# Patient Record
Sex: FEMALE | URBAN - METROPOLITAN AREA
[De-identification: names, ages, dates, MRNs, and addresses within clinical notes are randomized per-mention and may not be internally consistent; named-entity substitution may affect disease eponyms.]

---

## 2017-06-29 ENCOUNTER — OFFICE VISIT (OUTPATIENT)
Dept: NEUROLOGY | Facility: MEDICAL CENTER | Age: 57
End: 2017-06-29
Payer: COMMERCIAL

## 2017-06-29 VITALS
WEIGHT: 218.2 LBS | HEART RATE: 94 BPM | SYSTOLIC BLOOD PRESSURE: 128 MMHG | TEMPERATURE: 97.5 F | BODY MASS INDEX: 34.25 KG/M2 | RESPIRATION RATE: 16 BRPM | HEIGHT: 67 IN | DIASTOLIC BLOOD PRESSURE: 82 MMHG | OXYGEN SATURATION: 98 %

## 2017-06-29 DIAGNOSIS — G40.309 GENERALIZED CONVULSIVE EPILEPSY (HCC): ICD-10-CM

## 2017-06-29 PROCEDURE — 99213 OFFICE O/P EST LOW 20 MIN: CPT | Performed by: PSYCHIATRY & NEUROLOGY

## 2017-06-29 RX ORDER — VALPROIC ACID 250 MG/1
250 CAPSULE, LIQUID FILLED ORAL
Qty: 450 CAP | Refills: 3 | Status: SHIPPED | OUTPATIENT
Start: 2017-06-29 | End: 2018-05-03 | Stop reason: SDUPTHER

## 2017-06-29 RX ORDER — VALPROIC ACID 250 MG/1
250 CAPSULE, LIQUID FILLED ORAL
Qty: 450 CAP | Refills: 3 | Status: SHIPPED | OUTPATIENT
Start: 2017-06-29 | End: 2017-06-29 | Stop reason: SDUPTHER

## 2017-06-29 RX ORDER — ATORVASTATIN CALCIUM 10 MG/1
TABLET, FILM COATED ORAL
Refills: 3 | COMMUNITY
Start: 2017-06-22

## 2017-06-29 NOTE — MR AVS SNAPSHOT
"        Isabel Burleson   2017 11:40 AM   Office Visit   MRN: [id number]    Department:  [facility]   Dept Phone:  [phone]    Description:  Female : 1960   Provider:  Fabian Fagan M.D.           Reason for Visit     Follow-Up Generalized convulsive epilepsy      Allergies as of 2017     Allergen Noted Reactions    Asa [Aspirin] 2013   Hives      You were diagnosed with     Generalized convulsive epilepsy (CMS-HCC)   [1116677]         Vital Signs     Blood Pressure Pulse Temperature Respirations Height Weight    128/82 mmHg 94 36.4 °C (97.5 °F) 16 1.702 m (5' 7.01\") 98.975 kg (218 lb 3.2 oz)    Body Mass Index Oxygen Saturation Smoking Status             34.17 kg/m2 98% Former Smoker         Basic Information     Date Of Birth Sex Race Ethnicity Preferred Language    1960 Female Unknown Unknown English      Your appointments     2018 11:20 AM   Follow Up Visit with Fabian Fagan M.D.   [facility] (--)    [address] [place] [address], [address]  [facility] [id number] [phone]           You will be receiving a confirmation call a few days before your appointment from our automated call confirmation system.              Problem List              ICD-10-CM Priority Class Noted - Resolved    Type II or unspecified type diabetes mellitus with neurological manifestations, not stated as uncontrolled E11.49   Unknown - Present    Generalized convulsive epilepsy (CMS-HCC) G40.309   Unknown - Present    Obesity (BMI 30-39.9) E66.9   Unknown - Present    ASTHMA    Unknown - Present    GOUT    Unknown - Present    Elevated blood pressure [id number]   2015 - Present      Health Maintenance        Date Due Completion Dates    IMM HEP B VACCINE (1 of 3 - Primary Series) 1960 ---    IMM DTaP/Tdap/Td Vaccine (1 - Tdap) 3/25/1977 ---    IMM PNEUMOCOCCAL 19-64 (ADULT) MEDIUM RISK SERIES (1 of 1 - PPSV23) 3/25/1977 ---    PAP SMEAR 3/25/1979 ---   " COLONOSCOPY 3/25/2008 ---    MAMMOGRAM 8/9/2013 8/9/2012, 4/7/2010, 4/7/2010, 2/5/2009, 2/5/2009, 10/15/2007, 10/15/2007, 8/14/2006, 7/15/2005, 6/24/2004    RETINAL SCREENING 3/1/2015 3/1/2014    A1C SCREENING 3/2/2016 9/2/2015, 4/15/2015, 7/2/2014, 1/3/2014    FASTING LIPID PROFILE 4/21/2016 4/21/2015, 2/12/2013    URINE ACR / MICROALBUMIN 4/21/2016 4/21/2015, 2/12/2013    SERUM CREATININE 4/21/2016 4/21/2015, 2/12/2013    DIABETES MONOFILAMENT / LE EXAM 9/2/2016 9/2/2015 (Done), 4/15/2015 (N/S), 1/3/2014    Override on 9/2/2015: Done    Override on 4/15/2015: (N/S)            Current Immunizations     No immunizations on file.      Below and/or attached are the medications your provider expects you to take. Review all of your home medications and newly ordered medications with your provider and/or pharmacist. Follow medication instructions as directed by your provider and/or pharmacist. Please keep your medication list with you and share with your provider. Update the information when medications are discontinued, doses are changed, or new medications (including over-the-counter products) are added; and carry medication information at all times in the event of emergency situations     Allergies:  ASA - Hives               Medications  Valid as of: June 29, 2017 - 11:58 AM    Generic Name Brand Name Tablet Size Instructions for use    Atorvastatin Calcium (Tab) LIPITOR 10 MG TAKE 1 TABLET BY MOUTH ONCE DAILY FOR HIGH CHOLESTEROL        Blood Glucose Monitoring Suppl (Misc) Blood Glucose Monitoring Suppl SUPPLIES Test strips for Accucheck Anny meter. Testing blood sugar 2 times per day. ICD 9 250.00 Type 2 diabetes        Blood Glucose Monitoring Suppl (Misc) Blood Glucose Monitoring Suppl SUPPLIES One Touch Ultra test strips. Test once daily. Type 2 diabetes 250.00        Calcium Carbonate-Vitamin D (Tab) Calcium-D 600-400 MG-UNIT Take  by mouth.          Dapagliflozin Propanediol (Tab) Dapagliflozin Propanediol 5  MG Take  by mouth.        Guaifenesin-Codeine (Syrup) CHERATUSSIN -10 MG/5ML TAKE 1 TEASPOONFUL BY MOUTH EVERY 4 TO 6 HOURS AS NEEDED FOR COUGH        Lancets (Misc) ACCU-CHEK FASTCLIX LANCETS  1 Each by Does not apply route every day.        Lancets (Misc) Lancets  Accucheck FastClix lancets.  Using 2 per day. ICD9 250.00 Type 2 diabetes        Loratadine (Tab) CLARITIN 10 MG Take 10 mg by mouth as needed.        MetFORMIN HCl (Tab) GLUCOPHAGE 1000 MG Take 1 Tab by mouth 2 times a day, with meals.        SITagliptin Phosphate (Tab) JANUVIA 100 MG Take 100 mg by mouth every day.        Valproic Acid (Cap) DEPAKENE 250 MG Take 1 Cap by mouth 5 Times a Day.        .                 Medicines prescribed today were sent to:     None      Medication refill instructions:       If your prescription bottle indicates you have medication refills left, it is not necessary to call your provider’s office. Please contact your pharmacy and they will refill your medication.    If your prescription bottle indicates you do not have any refills left, you may request refills at any time through one of the following ways: The online [facility] system (except Urgent Care), by calling your provider’s office, or by asking your pharmacy to contact your provider’s office with a refill request. Medication refills are processed only during regular business hours and may not be available until the next business day. Your provider may request additional information or to have a follow-up visit with you prior to refilling your medication.   *Please Note: Medication refills are assigned a new Rx number when refilled electronically. Your pharmacy may indicate that no refills were authorized even though a new prescription for the same medication is available at the pharmacy. Please request the medicine by name with the pharmacy before contacting your provider for a refill.           [facility] Access Code: [email or web address]  Expires: 7/29/2017 11:58  AM    Your email address is not on file at [facility].  Email Addresses are required for you to sign up for [facility], please contact [phone] to verify your personal information and to provide your email address prior to attempting to register for Cotopaxit.    Isabel PATEL [address] [place] [id number]    Cotopaxit  A secure, online tool to manage your health information     [facility]’s Trevena® is a secure, online tool that connects you to your personalized health information from the privacy of your home -- day or night - making it very easy for you to manage your healthcare. Once the activation process is completed, you can even access your medical information using the Trevena joao, which is available for free in the Apple Joao store or Google Play store.     To learn more about Trevena, visit [email or web address]    There are two levels of access available (as shown below):   My Chart Features  [facility] Primary Care Doctor [facility]  Specialists [facility]  Urgent  Care Non-[facility] Primary Care Doctor   Email your healthcare team securely and privately 24/7 X X X    Manage appointments: schedule your next appointment; view details of past/upcoming appointments X      Request prescription refills. X      View recent personal medical records, including lab and immunizations X X X X   View health record, including health history, allergies, medications X X X X   Read reports about your outpatient visits, procedures, consult and ER notes X X X X   See your discharge summary, which is a recap of your hospital and/or ER visit that includes your diagnosis, lab results, and care plan X X  X     How to register for Cotopaxit:  Once your e-mail address has been verified, follow the following steps to sign up for Cotopaxit.     1. Go to  [email or web address]  2. Click on the Sign Up Now box, which takes you to the New Member Sign Up page. You will need to provide the following information:  a. Enter your  [facility] Access Code exactly as it appears at the top of this page. (You will not need to use this code after you’ve completed the sign-up process. If you do not sign up before the expiration date, you must request a new code.)   b. Enter your date of birth.   c. Enter your home email address.   d. Click Submit, and follow the next screen’s instructions.  3. Create a [facility]ic ID. This will be your [facility] login ID and cannot be changed, so think of one that is secure and easy to remember.  4. Create a Managed Systemst password. You can change your password at any time.  5. Enter your Password Reset Question and Answer. This can be used at a later time if you forget your password.   6. Enter your e-mail address. This allows you to receive e-mail notifications when new information is available in [facility].  7. Click Sign Up. You can now view your health information.    For assistance activating your [employer] account, call [phone]

## 2017-06-29 NOTE — PROGRESS NOTES
"Subjective:      Isabel Burleson is a 57 y.o. female who presents for annual follow-up with a history of generalized seizures.    DARRYN Hall continues to do well. She has had no seizure recurrences over the last year. Remains on name brand formulation Depakene 250 mg tablets taken 5 times a day. On occasion she will forget a morning dose, is uncomfortable driving, so she may miss a morning shift of 4 hours at work as a result. She will double up on her next dose to make up the difference. She tolerates this when it occurs. Fortunately she has had no seizures. She tolerates the drug otherwise.    Medical, surgical and family histories are reviewed, there are no new drug allergies. She is on her Depakene 250 mg tablets as above, also Lipitor, Januvia, Glucophage, the rest is per the electronic health record. She is now working several different jobs within the [employer].    Review of Systems   All other systems reviewed and are negative.       Objective:     /82 mmHg  Pulse 94  Temp(Src) 36.4 °C (97.5 °F)  Resp 16  Ht 1.702 m (5' 7.01\")  Wt 98.975 kg (218 lb 3.2 oz)  BMI 34.17 kg/m2  SpO2 98%     Physical Exam    She appears in no acute distress. Vital signs are stable. There was no malar rash. The neck is supple, cardiac evaluation is unremarkable. Carotid pulses are present without asymmetry. Including mental status, cranial nerves, motor, coordination, and sensory evaluations, the examination is essentially unchanged and unremarkable for significant deficits nor signs of toxicities.     Assessment/Plan:     1. Generalized convulsive epilepsy (CMS-HCC)  We will continue the Depakene 250 mg, 5 times a day, regimen. She always requires name brand formulation of the drug for efficacy and tolerability. FMLA forms were completed for her, allowing her a 4 hour interval once a month to accommodate the occasional missed doses of her drug. There is a risk of seizure recurrence, thus driving " restrictions need to be instituted temporary, though she can drive and return to work after 4 hours. I will follow-up with her in one year.    - valproic acid (DEPAKENE) 250 MG Cap; Take 1 Cap by mouth 5 Times a Day.  Dispense: 450 Cap; Refill: 3    Time: Evaluation of 20 minutes for exam come review, discussion, and education  Discussion: As mentioned in assessment, over 70% of the time spent face-to-face counseling and coordinating care

## 2018-05-03 DIAGNOSIS — G40.309 GENERALIZED CONVULSIVE EPILEPSY (HCC): ICD-10-CM

## 2018-05-07 RX ORDER — VALPROIC ACID 250 MG/1
250 CAPSULE, LIQUID FILLED ORAL
Qty: 450 CAP | Refills: 3 | Status: SHIPPED | OUTPATIENT
Start: 2018-05-07 | End: 2018-09-12 | Stop reason: CLARIF

## 2018-09-12 ENCOUNTER — OFFICE VISIT (OUTPATIENT)
Dept: NEUROLOGY | Facility: MEDICAL CENTER | Age: 58
End: 2018-09-12
Payer: COMMERCIAL

## 2018-09-12 VITALS
SYSTOLIC BLOOD PRESSURE: 120 MMHG | TEMPERATURE: 97.6 F | OXYGEN SATURATION: 95 % | HEIGHT: 67 IN | WEIGHT: 224.6 LBS | DIASTOLIC BLOOD PRESSURE: 84 MMHG | HEART RATE: 102 BPM | RESPIRATION RATE: 17 BRPM | BODY MASS INDEX: 35.25 KG/M2

## 2018-09-12 DIAGNOSIS — G40.309 GENERALIZED CONVULSIVE EPILEPSY (HCC): Primary | ICD-10-CM

## 2018-09-12 PROCEDURE — 99213 OFFICE O/P EST LOW 20 MIN: CPT | Performed by: PSYCHIATRY & NEUROLOGY

## 2018-09-12 ASSESSMENT — ENCOUNTER SYMPTOMS
FALLS: 0
SEIZURES: 0
TREMORS: 0
LOSS OF CONSCIOUSNESS: 0
WEIGHT LOSS: 0
HEMOPTYSIS: 0
BLOOD IN STOOL: 0

## 2018-09-12 ASSESSMENT — PATIENT HEALTH QUESTIONNAIRE - PHQ9: CLINICAL INTERPRETATION OF PHQ2 SCORE: 0

## 2018-09-12 NOTE — PROGRESS NOTES
"Subjective:      Isabel Burleson is a 58 y.o. female who presents for follow-up with a history of generalized seizures.    HPI    Over the last year she has had no seizure recurrences. She can on occasion miss the evening dose of 2 tablets, she will take them the next morning, wait a few hours and takes her morning dose of one tablet at that point, and then one tablet throughout the rest of the day twice, so she is never actually missed a dose. She tolerates the medication without issue otherwise. Her last seizure occurrence has been quite some time ago.    Her diabetes is doing well, there have been no issues with hypoglycemia and symptomatically seizures. Medical, surgical and family histories are reviewed in the electronic health record, there are no new drug allergies. 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 She is on Depakene 250 mg, name brand formulation only, 1 tablet 3 times a day and 2 tablets at bedtime, Januvia, Glucophage, Lipitor and vitamin D with calcium supplements.    Review of Systems   Constitutional: Negative for malaise/fatigue and weight loss.   HENT: Negative for nosebleeds.    Respiratory: Negative for hemoptysis.    Cardiovascular: Negative for leg swelling.   Gastrointestinal: Negative for blood in stool.   Genitourinary: Negative for hematuria.   Musculoskeletal: Negative for falls.   Skin: Negative for itching and rash.   Neurological: Negative for tremors, seizures and loss of consciousness.   All other systems reviewed and are negative.       Objective:     /84   Pulse (!) 102   Temp 36.4 °C (97.6 °F)   Resp 17   Ht 1.702 m (5' 7\")   Wt 101.9 kg (224 lb 9.6 oz)   SpO2 95%   BMI 35.18 kg/m²      Physical Exam    She appears in no acute distress. Vital signs are stable. There is no malar rash or temporal tenderness. The neck is supple, range of motion is full. Cardiac " evaluation is unremarkable. There is no evidence of rash, bruising, or edema.    Mental status is intact. PERRLA/EOMI, there are no nystagmus, visual fields are full, facial movements are symmetric, sensory exam was intact to temperature on both sides of the face, there is no bulbar dysfunction, the tongue and uvula are midline, jaw jerk is absent, shoulder shrug is symmetric. Musculoskeletal exam reveals normal tone without tremor, asterixis, or drift. Strength is 5/5 bilaterally in all 4 extremities, reflexes are symmetric, both toes are downgoing. She walks with normal station, there is no appendicular dystaxia, and gait is normal and stride length. There is no appendicular dystaxia. Fine motor control is intact in all 4 extremity. Sensory exam is intact to vibration.     Assessment/Plan:     1. Generalized convulsive epilepsy (HCC)  Clinically stable, we will continue the Depakene 250 mg tablets taken over the course of the day. She is compliant. There is no reason to check drug levels, there are no signs of toxicities on exam, she is having no seizure breakthroughs. We can follow-up in one year otherwise, she knows circumstances under which she needs to go to an emergency room versus call my office for seizure recurrence. She is safe to drive.    - DEPAKENE 250 MG Cap; 1 tab 3 times a day, and 2 tab qhs  Dispense: 450 Cap; Refill: 3    Time: 20 minutes is spent face-to-face with the patient for exam, review, discussion, and education, this over 50% of the time spent counseling and coordinating care.

## 2019-08-30 ENCOUNTER — TELEPHONE (OUTPATIENT)
Dept: NEUROLOGY | Facility: MEDICAL CENTER | Age: 59
End: 2019-08-30

## 2019-08-30 DIAGNOSIS — G40.309 GENERALIZED SEIZURE DISORDER (HCC): ICD-10-CM

## 2019-08-30 RX ORDER — DIVALPROEX SODIUM 250 MG
TABLET, DELAYED RELEASE (ENTERIC COATED) ORAL
Qty: 150 TAB | Refills: 11 | Status: SHIPPED | OUTPATIENT
Start: 2019-08-30 | End: 2020-08-03 | Stop reason: SDUPTHER

## 2019-08-30 RX ORDER — DIVALPROEX SODIUM 250 MG/1
TABLET, DELAYED RELEASE ORAL
Qty: 150 TAB | Refills: 11 | Status: SHIPPED | OUTPATIENT
Start: 2019-08-30 | End: 2019-08-30 | Stop reason: SDUPTHER

## 2019-08-30 NOTE — TELEPHONE ENCOUNTER
Spoke with pharmacy in regards to the patient's Depakene.  is discontinuing Depakene but they will still be able to get Depakote. Did you want to change the patient's prescription? She is wary about going to the generic. Please advise.     [facility]

## 2019-08-31 NOTE — TELEPHONE ENCOUNTER
If the pharmaceutical company that makes Depakene formulation is no longer doing so, there is little that we can do.  [place]  In any case, there are other forms of the drug that can be taken safely, the likelihood that she will lose control of her seizures is incredibly small.  The greater likelihood, though still small, is that she may have some side effects with the change in formulation from Depakene to valproic acid/Depakote.  There is still is a 250 mg strength tablet of the latter medication, so her overall medication regimen and timing does not need to be changed.  I will forward a prescription for the Depakote 250 mg tablets, 1 tablet 3 times daily and 2 tablets nightly with refills to the pharmacy.

## 2019-09-12 ENCOUNTER — OFFICE VISIT (OUTPATIENT)
Dept: NEUROLOGY | Facility: MEDICAL CENTER | Age: 59
End: 2019-09-12
Payer: COMMERCIAL

## 2019-09-12 VITALS
WEIGHT: 232 LBS | BODY MASS INDEX: 36.41 KG/M2 | DIASTOLIC BLOOD PRESSURE: 78 MMHG | RESPIRATION RATE: 14 BRPM | HEART RATE: 108 BPM | HEIGHT: 67 IN | OXYGEN SATURATION: 96 % | SYSTOLIC BLOOD PRESSURE: 130 MMHG | TEMPERATURE: 96 F

## 2019-09-12 DIAGNOSIS — G40.309 GENERALIZED SEIZURE DISORDER (HCC): Primary | ICD-10-CM

## 2019-09-12 PROCEDURE — 99213 OFFICE O/P EST LOW 20 MIN: CPT | Performed by: PSYCHIATRY & NEUROLOGY

## 2019-09-12 ASSESSMENT — ENCOUNTER SYMPTOMS
BRUISES/BLEEDS EASILY: 0
SEIZURES: 0
BLOOD IN STOOL: 0
TREMORS: 0
LOSS OF CONSCIOUSNESS: 0

## 2019-09-12 ASSESSMENT — PATIENT HEALTH QUESTIONNAIRE - PHQ9: CLINICAL INTERPRETATION OF PHQ2 SCORE: 0

## 2020-08-03 ENCOUNTER — OFFICE VISIT (OUTPATIENT)
Dept: NEUROLOGY | Facility: MEDICAL CENTER | Age: 60
End: 2020-08-03
Payer: COMMERCIAL

## 2020-08-03 VITALS
BODY MASS INDEX: 34.75 KG/M2 | SYSTOLIC BLOOD PRESSURE: 128 MMHG | HEART RATE: 110 BPM | DIASTOLIC BLOOD PRESSURE: 74 MMHG | OXYGEN SATURATION: 97 % | WEIGHT: 229.28 LBS | RESPIRATION RATE: 20 BRPM | HEIGHT: 68 IN

## 2020-08-03 DIAGNOSIS — G40.309 GENERALIZED SEIZURE DISORDER (HCC): ICD-10-CM

## 2020-08-03 PROCEDURE — 99213 OFFICE O/P EST LOW 20 MIN: CPT | Performed by: PSYCHIATRY & NEUROLOGY

## 2020-08-03 RX ORDER — DIVALPROEX SODIUM 250 MG
TABLET, DELAYED RELEASE (ENTERIC COATED) ORAL
Qty: 150 TAB | Refills: 11 | Status: SHIPPED | OUTPATIENT
Start: 2020-08-03 | End: 2021-08-02

## 2020-08-03 ASSESSMENT — ENCOUNTER SYMPTOMS
TREMORS: 0
MEMORY LOSS: 0
DIARRHEA: 0
SEIZURES: 0
LOSS OF CONSCIOUSNESS: 0

## 2020-08-03 ASSESSMENT — PATIENT HEALTH QUESTIONNAIRE - PHQ9: CLINICAL INTERPRETATION OF PHQ2 SCORE: 0

## 2020-08-03 NOTE — PROGRESS NOTES
"Subjective:      Isabel Burleson is a 60 y.o. female who presents for annual follow-up, with a history of generalized seizures.    DARRYN Hall states that she has had no seizures over the last year.  She is compliant with her Depakote regimen, she has never even late with her drugs.  She has tolerated the drug without issues of tremor, sleepiness, GI disturbances, rash, or incoordination.    Medical, surgical and family histories are reviewed in the electronic health record, there are no new drug allergies.  She is on Depakote 250 mg 3 times a day with each meal and 500 mg nightly, also Januvia, Glucophage, Lipitor, vitamin D with calcium and Claritin.    Review of Systems   Constitutional: Negative for malaise/fatigue.   Gastrointestinal: Negative for diarrhea.   Neurological: Negative for tremors, seizures and loss of consciousness.   Psychiatric/Behavioral: Negative for memory loss.   All other systems reviewed and are negative.       Objective:     /74 (BP Location: Left arm, Patient Position: Sitting, BP Cuff Size: Adult)   Pulse (!) 110   Resp 20   Ht 1.715 m (5' 7.5\")   Wt 104 kg (229 lb 4.5 oz)   SpO2 97%   BMI 35.38 kg/m²      Physical Exam    She appears in no acute distress.  Her vital signs are stable.  She is clean, appropriately dressed, and cooperative, and very pleasant spirit and demeanor.  There is no malar rash or jaw claudication.  The neck is supple, range of motion is full.  Cardiac evaluation is unremarkable.    Including mental status, cranial nerves, musculoskeletal, reflex, coordination, and since evaluations, the full neurologic examination is done and reveals no evidence of deficits nor toxicities.  Of note, the ankle jerks are diminished but still present bilaterally.  Vibration is diminished below the the ankles.     Assessment/Plan:     1. Generalized seizure disorder (HCC)  She is doing well, we will simply continue her present regimen.  She is safe to drive, " compliance is not an issue.  She understands circumstances that would bring her to the emergency room versus calling the office for seizure recurrence.  She has a medical identification that she wears 24/7.    - DEPAKOTE 250 MG Tablet Delayed Response; 1 tab tid and 2 tab qhs  Dispense: 150 Tab; Refill: 11    Time: 20-minute spent face-to-face for exam, review, discussion, and education, of this over 50% of the time spent counseling and coordinating care.

## 2021-09-22 DIAGNOSIS — G40.309 GENERALIZED SEIZURE DISORDER (HCC): ICD-10-CM

## 2021-09-23 RX ORDER — DIVALPROEX SODIUM 250 MG
TABLET, DELAYED RELEASE (ENTERIC COATED) ORAL
Qty: 150 TABLET | Refills: 1 | Status: SHIPPED | OUTPATIENT
Start: 2021-09-23 | End: 2021-10-25 | Stop reason: SDUPTHER

## 2021-09-23 NOTE — TELEPHONE ENCOUNTER
Received request via: Pharmacy    Was the patient seen in the last year in this department? No     Does the patient have an active prescription (recently filled or refills available) for medication(s) requested? No     Next appt 10/25/2021

## 2021-10-25 ENCOUNTER — OFFICE VISIT (OUTPATIENT)
Dept: NEUROLOGY | Facility: MEDICAL CENTER | Age: 61
End: 2021-10-25
Attending: PSYCHIATRY & NEUROLOGY
Payer: COMMERCIAL

## 2021-10-25 VITALS
HEIGHT: 68 IN | OXYGEN SATURATION: 93 % | RESPIRATION RATE: 14 BRPM | DIASTOLIC BLOOD PRESSURE: 70 MMHG | WEIGHT: 240.3 LBS | BODY MASS INDEX: 36.42 KG/M2 | SYSTOLIC BLOOD PRESSURE: 118 MMHG | TEMPERATURE: 98 F | HEART RATE: 82 BPM

## 2021-10-25 DIAGNOSIS — G40.309 GENERALIZED SEIZURE DISORDER (HCC): Primary | ICD-10-CM

## 2021-10-25 PROCEDURE — 99212 OFFICE O/P EST SF 10 MIN: CPT | Performed by: PSYCHIATRY & NEUROLOGY

## 2021-10-25 PROCEDURE — 99214 OFFICE O/P EST MOD 30 MIN: CPT | Performed by: PSYCHIATRY & NEUROLOGY

## 2021-10-25 RX ORDER — LEVOTHYROXINE SODIUM 0.03 MG/1
25 TABLET ORAL DAILY
COMMUNITY
Start: 2021-10-06 | End: 2022-08-22

## 2021-10-25 RX ORDER — DIVALPROEX SODIUM 250 MG
TABLET, DELAYED RELEASE (ENTERIC COATED) ORAL
Qty: 450 TABLET | Refills: 3 | Status: SHIPPED | OUTPATIENT
Start: 2021-10-25 | End: 2022-01-03

## 2021-10-25 ASSESSMENT — ENCOUNTER SYMPTOMS
LOSS OF CONSCIOUSNESS: 0
SEIZURES: 0
FALLS: 1

## 2021-10-25 ASSESSMENT — PATIENT HEALTH QUESTIONNAIRE - PHQ9: CLINICAL INTERPRETATION OF PHQ2 SCORE: 0

## 2021-10-25 NOTE — PROGRESS NOTES
"Subjective     Isabel Burleson is a 61 y.o. female who presents for annual follow-up, with a history of generalized seizures.    HPI    Last seen in August, 2020, since that time she has had no seizure recurrences.  She has been compliant with Depakote 250 mg tablets, 1 tablet 3 times daily with meals and 2 tablets at bedtime.  He is tolerating the drug without issues of dizziness, gait insufficiency, tremor, headache, GI disturbances, rash, bruising, myalgias or arthralgias.    She has fallen on a couple of occasions, the most recent at nighttime.  This was not associated with seizure.  She certainly bruised her left knee as well as left forearm, it took her an hour to get up, but she is doing well.    Medical, surgical and family histories are reviewed, there are no new drug allergies.  She is on her Depakote as above, as well as Januvia, Glucophage, Lipitor, Synthroid, vitamin D with calcium and Claritin as needed.    Review of Systems   Musculoskeletal: Positive for falls.   Neurological: Negative for seizures and loss of consciousness.   All other systems reviewed and are negative.    Objective     /70 (BP Location: Left arm, Patient Position: Sitting, BP Cuff Size: Adult)   Pulse 82   Temp 36.7 °C (98 °F) (Temporal)   Resp 14   Ht 1.715 m (5' 7.5\")   Wt 109 kg (240 lb 4.8 oz)   SpO2 93%   BMI 37.08 kg/m²      Physical Exam    She appears in no acute distress.  Her vital signs are stable.  There is no malar rash, conjunctival icterus, jaw or temporal tenderness, or jaw claudication.  Her neck is supple, range of motion is full.  Cardiac evaluation is unremarkable.  There is no evidence of rash.     Neurological Exam    Fully oriented, there is no aphasia, apraxia, or inattention.    PERRLA/EOMI.  Visual fields are full to movement detection on confrontation bilaterally.  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  Facial movements are symmetric, there is no " sensory loss.  The tongue and uvula are midline.  Shoulder shrug is symmetric.    Musculoskeletal exam is limited because of her left lower extremity pain.  Still there is no tremor or drift.  Strength is intact throughout.  Reflexes are present though they are diminished at the ankles relatively.  There are no asymmetries.  The left knee jerk was not assessed because of pain.    She stands slowly, is more hobbled because of the left leg pain.  Repetitive movements with all 4 extremities are normal with symmetric amplitude and frequencies.  There is no appendicular dystaxia with the upper extremities.    Sensory exam reveals only a mild stocking-pattern loss of vibration below the shins.    Assessment & Plan     1. Generalized seizure disorder (HCC)  Stable clinically, we will continue the Depakote regimen unchanged.  I cannot explain the balance difficulties she is having recently as being part of her seizure disorder, certainly not her seizure medication.  Though she has a mild neuropathy from her diabetes, this is nothing severe enough that I might suspect as a cause.  There is no reason to check a Depakote level at this time.  She now will follow-up in August of next year.    - DEPAKOTE 250 MG Tablet Delayed Response; TAKE 1 TABLET BY MOUTH THREE TIMES A DAY AND 2 TABLETS AT BEDTIME  Dispense: 450 Tablet; Refill: 3    Time: A full 30 minutes was spent on patient care for precharting, record review, discussions with healthcare staff as well as documentation.  This also included face-to-face time with the patient for exam, education, counseling and coordinating care.

## 2022-01-03 DIAGNOSIS — G40.309 GENERALIZED SEIZURE DISORDER (HCC): ICD-10-CM

## 2022-01-03 RX ORDER — DIVALPROEX SODIUM 250 MG
TABLET, DELAYED RELEASE (ENTERIC COATED) ORAL
Qty: 150 TABLET | Refills: 11 | Status: SHIPPED | OUTPATIENT
Start: 2022-01-03 | End: 2022-11-23 | Stop reason: SDUPTHER

## 2022-08-22 ENCOUNTER — OFFICE VISIT (OUTPATIENT)
Dept: NEUROLOGY | Facility: MEDICAL CENTER | Age: 62
End: 2022-08-22
Attending: PSYCHIATRY & NEUROLOGY
Payer: COMMERCIAL

## 2022-08-22 VITALS
HEART RATE: 107 BPM | DIASTOLIC BLOOD PRESSURE: 78 MMHG | TEMPERATURE: 97.1 F | HEIGHT: 68 IN | BODY MASS INDEX: 34.68 KG/M2 | SYSTOLIC BLOOD PRESSURE: 122 MMHG | WEIGHT: 228.84 LBS | OXYGEN SATURATION: 94 %

## 2022-08-22 DIAGNOSIS — G40.309 GENERALIZED SEIZURE DISORDER (HCC): Primary | ICD-10-CM

## 2022-08-22 DIAGNOSIS — R25.1 TREMOR OF BOTH HANDS: ICD-10-CM

## 2022-08-22 PROCEDURE — 99214 OFFICE O/P EST MOD 30 MIN: CPT | Performed by: PSYCHIATRY & NEUROLOGY

## 2022-08-22 PROCEDURE — 99212 OFFICE O/P EST SF 10 MIN: CPT | Performed by: PSYCHIATRY & NEUROLOGY

## 2022-08-22 ASSESSMENT — ENCOUNTER SYMPTOMS
MEMORY LOSS: 0
LOSS OF CONSCIOUSNESS: 0
TREMORS: 1
NERVOUS/ANXIOUS: 1
SEIZURES: 0

## 2022-08-22 ASSESSMENT — PATIENT HEALTH QUESTIONNAIRE - PHQ9: CLINICAL INTERPRETATION OF PHQ2 SCORE: 0

## 2022-08-22 NOTE — PROGRESS NOTES
"Subjective     Isabel Burleson is a 62 y.o. female who presents for follow-up, with a history of generalized seizures but who has noticed over the last year and intermittent tremulousness in both hands.     HPI    Seizures: Isabel states that she has had no seizures over the last year.  She continues to tolerate her Depakote regimen, taking 250 mg 3 times a day with meals and then 500 mg in the evening.    Tremor: She has begun to notice an intermittent tremulousness with the hands, mostly when she is using extremity to carry an object, such as water, etc.  Handwriting is a little shakier as well.  She has not is aware of this when she is seated and still, with her hands resting.  There is no involvement of the head or voice.  This is intermittent only, activities that can bring up the tremulousness on an occasion may not on another.    She denies any family history of tremor or Parkinson's disease.  It does not seem to coincide with her dosing of Depakote.    Medical, surgical and family histories are reviewed, there are no new drug allergies.  She had been placed on some medication for blood pressure as well as control of her heart rate, she has refused to take it.  She was taken off her Synthroid.    She is on Depakote as above, also Januvia, Glucophage, calcium and Claritin.    Review of Systems   Neurological:  Positive for tremors. Negative for seizures and loss of consciousness.   Psychiatric/Behavioral:  Negative for memory loss. The patient is nervous/anxious.    All other systems reviewed and are negative.    Objective     /78 (BP Location: Right arm, Patient Position: Sitting, BP Cuff Size: Adult)   Pulse (!) 107   Temp 36.2 °C (97.1 °F) (Temporal)   Ht 1.727 m (5' 8\")   Wt 104 kg (228 lb 13.4 oz)   SpO2 94%   BMI 34.79 kg/m²      Physical Exam    She appears in no acute distress.  Vital signs are stable, though her pulse is elevated at 107.  There is no malar rash or sialorrhea.  Her neck " is supple.  Cardiac evaluation reveals a regular rhythm.     Neurological Exam    She is fully oriented, there is no aphasia, apraxia, or inattention.    PERRLA/EOMI, visual fields are full, facial movements are symmetric.  There is no bradykinesia or hypophonia.  The tongue and uvula are midline without bulbar dysfunction.  Sensory exam is intact to temperature.  There is no tremulousness of the head or voice.  Shoulder shrug and head rotation are normal.    Musculoskeletal exam reveals normal tone, there is no asterixis or drift.  There is a tremulousness seen with the hands, left greater than right, mostly with action, sustained posture against gravity, and it attenuates at rest.  It is nonrotational, more of an alternating, side-2-side nature.  Strength is intact bilaterally.  There are no obvious reflex asymmetries of the ankle jerks are absent.    She stands easily.  The tremor is seen with the upper extremities through a full range of motion.  Armswing is symmetric.  There is no appendicular dystaxia.  Repetitive movements are intact in all 4 extremities with symmetric amplitude and frequencies.  There is no bradykinesia.    Sensory exam reveals a stocking pattern decrement of vibration bilaterally in the lower extremities.    Assessment & Plan     1. Generalized seizure disorder (HCC)  Fortunately, things are stable.  She will continue her present regimen unchanged.  The Depakote level and vitamin D level both will be checked as baseline studies.  She knows to call the office for any breakthrough seizure activity.    - FREE VALPROIC ACID (DEPAKOTE)  - VITAMIN D,25 HYDROXY; Future    2. Tremor of both hands  Whether or not this is an essential tremor that has been made symptomatic because of Depakote use, or secondary to the drug itself, for now we can simply observe.  She cannot obviously discontinue Depakote.  There is nothing to suggest Parkinson's disease or a secondary parkinsonian syndrome (Depakote has  an associated history.)    We will follow-up in 1 year.  Phone contact in the interim if needed.    Time: 30 minutes in total spent on patient care including pre-charting, record review, discussion with healthcare staff and documentation.  This includes face-to-face time for exam, review, discussion, as well as counseling and coordinating care.

## 2022-11-23 DIAGNOSIS — G40.309 GENERALIZED SEIZURE DISORDER (HCC): ICD-10-CM

## 2022-11-23 RX ORDER — DIVALPROEX SODIUM 250 MG
TABLET, DELAYED RELEASE (ENTERIC COATED) ORAL
Qty: 150 TABLET | Refills: 11 | Status: SHIPPED | OUTPATIENT
Start: 2022-11-23 | End: 2022-12-29

## 2022-12-29 DIAGNOSIS — G40.309 GENERALIZED SEIZURE DISORDER (HCC): ICD-10-CM

## 2022-12-29 RX ORDER — DIVALPROEX SODIUM 250 MG
TABLET, DELAYED RELEASE (ENTERIC COATED) ORAL
Qty: 150 TABLET | Refills: 11 | Status: SHIPPED | OUTPATIENT
Start: 2022-12-29 | End: 2023-06-07

## 2022-12-30 NOTE — TELEPHONE ENCOUNTER
Received request via: Pharmacy    Was the patient seen in the last year in this department? Yes    Does the patient have an active prescription (recently filled or refills available) for medication(s) requested? No    Does the patient have [facility] Plus and need 100 day supply (blood pressure, diabetes and cholesterol meds only)? Medication is not for cholesterol, blood pressure or diabetes

## 2023-03-24 ENCOUNTER — TELEPHONE (OUTPATIENT)
Dept: NEUROLOGY | Facility: MEDICAL CENTER | Age: 63
End: 2023-03-24
Payer: MEDICARE

## 2023-03-24 NOTE — TELEPHONE ENCOUNTER
"VOICEMAIL  1. Caller Name: Isabel Burleson        Call Back Number: [phone]    2. Message: Patient called front line and left a voicemail stating that she has been unable to  her Depakote 250mg from the [facility] in [place]. She states that that they are \"no longer making it\" but after speaking with Dr. Fagan he believes that she may just be unable to get it from this pharmacy. I attempted to call patient to see what pharmacy she would like medication sent to but there was no answer and no VM set up, Please call patient to discuss. Thank you!  -JASON Brito.    "

## 2023-03-28 ENCOUNTER — TELEPHONE (OUTPATIENT)
Dept: NEUROLOGY | Facility: MEDICAL CENTER | Age: 63
End: 2023-03-28
Payer: MEDICARE

## 2023-03-28 DIAGNOSIS — G40.309 GENERALIZED SEIZURE DISORDER (HCC): ICD-10-CM

## 2023-03-28 RX ORDER — VALPROIC ACID 250 MG/1
CAPSULE, LIQUID FILLED ORAL
Qty: 150 CAPSULE | Refills: 5 | Status: SHIPPED | OUTPATIENT
Start: 2023-03-28 | End: 2023-03-29 | Stop reason: SDUPTHER

## 2023-03-28 NOTE — TELEPHONE ENCOUNTER
VOICEMAIL  1. Caller Name:                                   Mark Manning [facility]                      Call Back Number: [phone]    2. Message: Mark called to say that they no longer make the Depakote delayed release 250 mg and wanted to know if Dr Fall could prescribe the ER instead.     3. Patient approves office to leave a detailed voicemail/MyChart message: N\A    I HAVE SENT A NOTE TO DR FALL EXPLAINING THE CIRCUMSTANCES AND WILL CALL THE PHARMACY BACK AS SOON AS I GET A RESPONSE FROM DR FALL!!

## 2023-03-29 DIAGNOSIS — G40.309 GENERALIZED SEIZURE DISORDER (HCC): ICD-10-CM

## 2023-03-29 RX ORDER — VALPROIC ACID 250 MG/1
CAPSULE, LIQUID FILLED ORAL
Qty: 150 CAPSULE | Refills: 5 | Status: SHIPPED | OUTPATIENT
Start: 2023-03-29 | End: 2023-06-07 | Stop reason: SDUPTHER

## 2023-03-29 RX ORDER — VALPROIC ACID 250 MG/1
CAPSULE, LIQUID FILLED ORAL
Qty: 150 CAPSULE | Refills: 5 | Status: SHIPPED | OUTPATIENT
Start: 2023-03-29 | End: 2023-06-07

## 2023-03-29 NOTE — TELEPHONE ENCOUNTER
Received request via: Patient    Was the patient seen in the last year in this department? Yes    Does the patient have an active prescription (recently filled or refills available) for medication(s) requested? Yes.    Does the patient have [facility] Plus and need 100 day supply (blood pressure, diabetes and cholesterol meds only)? Medication is not for cholesterol, blood pressure or diabetes    PATIENT NEEDS THE MEDICINE TO GO TO THE [facility] FOR FINANCIAL REASONS!!

## 2023-03-29 NOTE — TELEPHONE ENCOUNTER
03/29/23  Per Dr Fagan I called to let the patient know that she would now be taking a form of Depakote called Depakene 250 MG and she would be taking 1 pill 3 times a day and 2 at night. The patient communicated understanding and thanked me for calling her. HL

## 2023-06-07 ENCOUNTER — TELEPHONE (OUTPATIENT)
Dept: NEUROLOGY | Facility: MEDICAL CENTER | Age: 63
End: 2023-06-07
Payer: MEDICARE

## 2023-06-07 ENCOUNTER — OFFICE VISIT (OUTPATIENT)
Dept: NEUROLOGY | Facility: MEDICAL CENTER | Age: 63
End: 2023-06-07
Attending: PSYCHIATRY & NEUROLOGY
Payer: COMMERCIAL

## 2023-06-07 VITALS
HEIGHT: 67 IN | OXYGEN SATURATION: 96 % | SYSTOLIC BLOOD PRESSURE: 132 MMHG | HEART RATE: 115 BPM | WEIGHT: 224.87 LBS | BODY MASS INDEX: 35.29 KG/M2 | TEMPERATURE: 96.7 F | DIASTOLIC BLOOD PRESSURE: 76 MMHG

## 2023-06-07 DIAGNOSIS — R25.1 TREMOR OF BOTH HANDS: ICD-10-CM

## 2023-06-07 DIAGNOSIS — G40.309 GENERALIZED SEIZURE DISORDER (HCC): ICD-10-CM

## 2023-06-07 PROCEDURE — 99212 OFFICE O/P EST SF 10 MIN: CPT | Performed by: PSYCHIATRY & NEUROLOGY

## 2023-06-07 PROCEDURE — 3078F DIAST BP <80 MM HG: CPT | Performed by: PSYCHIATRY & NEUROLOGY

## 2023-06-07 PROCEDURE — 99214 OFFICE O/P EST MOD 30 MIN: CPT | Performed by: PSYCHIATRY & NEUROLOGY

## 2023-06-07 PROCEDURE — 3075F SYST BP GE 130 - 139MM HG: CPT | Performed by: PSYCHIATRY & NEUROLOGY

## 2023-06-07 RX ORDER — APIXABAN 5 MG/1
5 TABLET, FILM COATED ORAL 2 TIMES DAILY
COMMUNITY
Start: 2023-05-09

## 2023-06-07 RX ORDER — BLOOD-GLUCOSE METER
EACH MISCELLANEOUS
COMMUNITY
Start: 2023-04-10

## 2023-06-07 RX ORDER — LANOLIN ALCOHOL/MO/W.PET/CERES
1000 CREAM (GRAM) TOPICAL
COMMUNITY
Start: 2023-05-26

## 2023-06-07 RX ORDER — VALPROIC ACID 250 MG/1
CAPSULE, LIQUID FILLED ORAL
Qty: 450 CAPSULE | Refills: 3 | Status: SHIPPED | OUTPATIENT
Start: 2023-06-07

## 2023-06-07 RX ORDER — EMPAGLIFLOZIN 10 MG/1
1 TABLET, FILM COATED ORAL DAILY
COMMUNITY
Start: 2023-05-09

## 2023-06-07 ASSESSMENT — ENCOUNTER SYMPTOMS
TREMORS: 1
LOSS OF CONSCIOUSNESS: 0
MEMORY LOSS: 0
PHOTOPHOBIA: 0
SEIZURES: 0
HEADACHES: 0
FALLS: 1

## 2023-06-07 ASSESSMENT — PATIENT HEALTH QUESTIONNAIRE - PHQ9: CLINICAL INTERPRETATION OF PHQ2 SCORE: 0

## 2023-06-07 NOTE — TELEPHONE ENCOUNTER
NEUROLOGY PATIENT PRE-VISIT PLANNING     Patient was NOT contacted to complete PVP.    Patient Appointment is scheduled as: Established Patient     Is visit type and length scheduled correctly? Yes    [employer] Patient is checked in Patient Demographics? Yes    3.   Is referral attached to visit? Yes    4. Were records received from referring provider? Yes    4. Patient was NOT contacted to have someone accompany them to visit.     5. Is this appointment scheduled as a Hospital Follow-Up?  No    6. Does the patient require any pre procedure or post procedure follow up? No    7. If any orders were placed at last visit or intended to be done for this visit do we have Results/Consult Notes? Yes  Labs - Labs were ordered but NOT completed at last visit.   Imaging - Imaging was not ordered at last office visit.  Referrals - No referrals were ordered at last office visit.    8. If patient appointment is for Botox - is order pended for provider? N/A

## 2023-06-07 NOTE — PROGRESS NOTES
Subjective     Isabel Burleson is a 63 y.o. female who presents for follow-up, with a history of generalized epilepsy and hand tremor.     HPI    Seizures: Since last seen in August, 2022, Isabel states she has done well.  She has rarely missed the dose of her valproic acid, without consequence.  Insurance company is no longer covering Depakote, it is no using valproate (Depakene), but still had a 250 mg 3 times daily regimen with 500 mg nightly.  She has had no side effects or other toxicities develop with the new formulation.  Because it is in the capsule, she is concerned about heat and capsules melting.    Tremor: Still in the hands, right more than left-sided, she is most aware when she is using the extremities, it fluctuates in severity from day-to-day.  There are days when others notice it and she does not.  There is no incoordination.  There is no weakness.  It is little bit more apparent when she is fatigued or tired.  It attenuates at rest.  There is no loss of strength or dexterity with her hands.  She is always denied issues with voice volume or quality, loss of smell, internalized tremulousness, postural instability, etc.    She tripped her self while walking into the living room yesterday, catching her left foot on her recliner.  She bumped the back of her head.  She has had some soreness and tenderness over the scalp itself, she denies any other problems with increasing headaches, nausea, worsening gait ataxia, etc.  She denies any paresthesias or dysesthesias with her feet.  The left lower extremity remains edematous and painful.    Medical, surgical and family histories are reviewed, there are no new drug allergies.  She is on Depakene 250 mg capsules 3 times a day and 500 mg in the evening, Glucophage, Lipitor, vitamin B12, vitamin D with calcium, and Jardiance.    Review of Systems   Constitutional:  Negative for malaise/fatigue.   Eyes:  Negative for photophobia.   Cardiovascular:  Positive  "for leg swelling.   Musculoskeletal:  Positive for falls.   Neurological:  Positive for tremors. Negative for seizures, loss of consciousness and headaches.   Psychiatric/Behavioral:  Negative for memory loss.    All other systems reviewed and are negative.    Objective     /76 (BP Location: Right arm, Patient Position: Sitting, BP Cuff Size: Adult)   Pulse (!) 115   Temp 35.9 °C (96.7 °F) (Temporal)   Ht 1.702 m (5' 7\")   Wt 102 kg (224 lb 13.9 oz)   SpO2 96%   BMI 35.22 kg/m²      Physical Exam    As always, she appears in no acute distress.  Vital signs are stable.  Pulse is slightly elevated at 115 though her rhythm is regular.  There is no malar rash or sialorrhea.  The neck is supple, range of motion is full.  Cardiac evaluation is unremarkable.  Left lower extremity edema is still significant, extending pretibially.     Neurological Exam    Fully oriented, there is no aphasia, apraxia, or inattention.    PERRLA/EOMI, visual fields are full.  There is no hypophonia or tremulousness of the head and neck. The tongue and uvula are midline without bulbar dysfunction.  Facial movements are symmetric.  There is no bradykinesia or hypophonia.  Shoulder shrug and head rotation are normal.    Musculoskeletal exam does reveal a fine tremulousness with both hands, right slightly more prominent than left.  It attenuates completely at rest, tone is normal throughout, even with distraction.  There is no drift or asterixis.  Strength is intact.  Reflexes are symmetric, the right ankle jerk is absent, I difficulty eliciting the left because of the edema.    She stands slowly, she can walk with her cane, station is slightly widened.  She does not shuffle, stride length is symmetric.  There is no appendicular dystaxia, tremulousness with the hands is seen through full range of motion.  Repetitive movements do show good amplitudes in all 4 extremities.  They are slightly slowed with the left foot because of the " edema.    Sensory exam is intact to vibration and temperature.    Assessment & Plan     1. Tremor of both hands  I still suspect this is a likely benign, essential tremor, an action based process only.  There is nothing to suggest EPS dysfunction at this time.  It is mild and episodic only, we can avoid treating at this time.  It is not been made worse because of the Depakote that she is on for her seizures.  She was reassured about all of the above.    2. Generalized seizure disorder (HCC)  Stable, we will continue the Depakene regimen unchanged.  There is no reason to check drug levels at this time.  We will follow-up in 1 year.    Time: 30 minutes in total spent on patient care including pre-charting, record review, discussion with healthcare staff and documentation.  This includes face-to-face time for exam, review, discussion, as well as counseling and coordinating care.

## 2024-05-14 ENCOUNTER — OFFICE VISIT (OUTPATIENT)
Dept: NEUROLOGY | Facility: MEDICAL CENTER | Age: 64
End: 2024-05-14
Attending: PSYCHIATRY & NEUROLOGY
Payer: MEDICARE

## 2024-05-14 VITALS
WEIGHT: 224.65 LBS | TEMPERATURE: 98.4 F | OXYGEN SATURATION: 92 % | DIASTOLIC BLOOD PRESSURE: 64 MMHG | SYSTOLIC BLOOD PRESSURE: 110 MMHG | BODY MASS INDEX: 35.26 KG/M2 | HEART RATE: 114 BPM | HEIGHT: 67 IN | RESPIRATION RATE: 16 BRPM

## 2024-05-14 DIAGNOSIS — R25.1 TREMOR OF BOTH HANDS: ICD-10-CM

## 2024-05-14 DIAGNOSIS — G40.309 GENERALIZED SEIZURE DISORDER (HCC): Primary | ICD-10-CM

## 2024-05-14 PROCEDURE — 3074F SYST BP LT 130 MM HG: CPT | Performed by: PSYCHIATRY & NEUROLOGY

## 2024-05-14 PROCEDURE — 99214 OFFICE O/P EST MOD 30 MIN: CPT | Performed by: PSYCHIATRY & NEUROLOGY

## 2024-05-14 PROCEDURE — 3079F DIAST BP 80-89 MM HG: CPT | Performed by: PSYCHIATRY & NEUROLOGY

## 2024-05-14 RX ORDER — ALBUTEROL SULFATE 90 UG/1
AEROSOL, METERED RESPIRATORY (INHALATION)
COMMUNITY
Start: 2024-02-20

## 2024-05-14 RX ORDER — VALPROIC ACID 250 MG/1
CAPSULE, LIQUID FILLED ORAL
Qty: 450 CAPSULE | Refills: 3 | Status: SHIPPED | OUTPATIENT
Start: 2024-05-14 | End: 2024-05-14 | Stop reason: SDUPTHER

## 2024-05-14 RX ORDER — VALPROIC ACID 250 MG/1
CAPSULE, LIQUID FILLED ORAL
Qty: 450 CAPSULE | Refills: 3 | Status: SHIPPED | OUTPATIENT
Start: 2024-05-14

## 2024-05-14 RX ORDER — DEXAMETHASONE 4 MG/1
4 TABLET ORAL
COMMUNITY
Start: 2024-04-12

## 2024-05-14 ASSESSMENT — ENCOUNTER SYMPTOMS
SEIZURES: 0
TREMORS: 1
LOSS OF CONSCIOUSNESS: 1
INSOMNIA: 0
MEMORY LOSS: 0

## 2024-05-14 ASSESSMENT — PATIENT HEALTH QUESTIONNAIRE - PHQ9: CLINICAL INTERPRETATION OF PHQ2 SCORE: 0

## 2024-05-14 NOTE — PROGRESS NOTES
"Subjective     Isabel Burleson is a 64 y.o. female who presents for follow-up, with a history of generalized seizures and essential tremor.     HPI    Seizures: Isabel has had no seizures since last seen over 1 year ago.  She has been on Depakene 250 mg capsules, 1 capsule 3 times a day and 2 capsules at bedtime.  She tolerates the drug without issue.  She is compliant.    Tremor: Still with both hands, it is episodic, but when she is more stressed, fatigue, when she wants to be more careful when she is around other individuals, it tends to become problematic.  Relaxation always helps.  There is no involvement of the head, voice, she is always denied and internalized tremulousness.  The EPS systems review remains negative.  The Depakote itself has never made this worse.    Medical, surgical and family histories are reviewed, there are no new drug allergies.  Other than the Depakene as above, she is on Glucophage, Lipitor, vitamin D with calcium, vitamin B12, Decadron, Eliquis 5 mg twice daily, and Jardiance.    Review of Systems   Neurological:  Positive for tremors and loss of consciousness. Negative for seizures.   Psychiatric/Behavioral:  Negative for memory loss. The patient does not have insomnia.    All other systems reviewed and are negative.    Objective     /80 (BP Location: Right arm, Patient Position: Sitting, BP Cuff Size: Adult)   Pulse (!) 114   Temp 36.9 °C (98.4 °F) (Temporal)   Resp 16   Ht 1.702 m (5' 7\")   Wt 102 kg (224 lb 10.4 oz)   SpO2 92%   BMI 35.18 kg/m²      Physical Exam    She appears in no acute distress.  Vital signs are stable.  The right hemifacial atrophy is unchanged, there is symmetry resulting in a functional right ptosis.  There is no jaw claudication.  The neck is supple.  Cardiac evaluation is unremarkable.     Neurological Exam    Fully oriented, there is no aphasia, apraxia, or inattention.    PERRLA/EOMI, visual fields are full, right facial movement is a " little slower compared to the left though eye closure as well as lip apposition is intact and symmetric.  There is no sensory loss.  The tongue and uvula are midline.  Shoulder shrug and head rotation are normal.    Musculoskeletal exam reveals normal tone, there is a fine tremulousness with the hands, seen with sustained posture against gravity but also with action through full range of motion.  There is no bradykinesia, the tremor attenuates completely at rest.  Strength is intact throughout.    Reflex exam was deferred.    She stands slowly, as always walks with a cane on the right.  There is no appendicular dystaxia.  The tremor is seen through full range of motion, it does not increase with intention.  Repetitive movements are symmetric.    Sensory exam is intact to temperature and vibration.    Assessment & Plan     1. Generalized seizure disorder (HCC)  Stable, Depakene will be continued unchanged.  There is no reason to check a drug level at this time.  She tolerates the drug without issue.    - valproic acid (DEPAKENE) 250 MG Cap; 1 tab tid and 2 tab qhs  Dispense: 450 Capsule; Refill: 3    2. Tremor of both hands  Also stable, it is a bit more apparent on today's exam when compared to that from one year ago.  Still it is not disabling, she did not want to take medication at this time, and I concur.  She was reassured that this is not a harbinger of developing a neurodegenerative disease such as Parkinson's.  She was also told that this condition will slowly change and worsen over time, though this rate of progression and it severity cannot be predicted.  With treatment the symptoms mandate.  Treating earlier does not slow this process.  Will follow-up in 1 year.    Time: 30 minutes in total spent on patient care including pre-charting, record review, discussion with healthcare staff and documentation.  This includes face-to-face time for exam, review, discussion, as well as counseling and coordinating  care.

## 2025-01-16 ENCOUNTER — TELEPHONE (OUTPATIENT)
Dept: HEALTH INFORMATION MANAGEMENT | Facility: OTHER | Age: 65
End: 2025-01-16
Payer: MEDICARE

## 2025-01-17 ENCOUNTER — APPOINTMENT (OUTPATIENT)
Dept: NEUROLOGY | Facility: MEDICAL CENTER | Age: 65
End: 2025-01-17
Attending: PSYCHIATRY & NEUROLOGY
Payer: MEDICARE

## 2025-05-06 ENCOUNTER — HOSPITAL ENCOUNTER (OUTPATIENT)
Dept: LAB | Facility: MEDICAL CENTER | Age: 65
End: 2025-05-06
Attending: PSYCHIATRY & NEUROLOGY
Payer: MEDICARE

## 2025-05-06 ENCOUNTER — OFFICE VISIT (OUTPATIENT)
Dept: NEUROLOGY | Facility: MEDICAL CENTER | Age: 65
End: 2025-05-06
Attending: PSYCHIATRY & NEUROLOGY
Payer: MEDICARE

## 2025-05-06 VITALS
HEIGHT: 66 IN | OXYGEN SATURATION: 95 % | TEMPERATURE: 97.6 F | HEART RATE: 95 BPM | RESPIRATION RATE: 16 BRPM | DIASTOLIC BLOOD PRESSURE: 76 MMHG | SYSTOLIC BLOOD PRESSURE: 122 MMHG | WEIGHT: 222 LBS | BODY MASS INDEX: 35.68 KG/M2

## 2025-05-06 DIAGNOSIS — R25.1 TREMOR OF BOTH HANDS: ICD-10-CM

## 2025-05-06 DIAGNOSIS — R29.0 TETANY: ICD-10-CM

## 2025-05-06 DIAGNOSIS — G40.309 GENERALIZED SEIZURE DISORDER (HCC): ICD-10-CM

## 2025-05-06 DIAGNOSIS — G40.309 GENERALIZED SEIZURE DISORDER (HCC): Primary | ICD-10-CM

## 2025-05-06 LAB
ALBUMIN SERPL BCP-MCNC: 3.5 G/DL (ref 3.2–4.9)
ALBUMIN/GLOB SERPL: 1.2 G/DL
ALP SERPL-CCNC: 65 U/L (ref 30–99)
ALT SERPL-CCNC: 9 U/L (ref 2–50)
ANION GAP SERPL CALC-SCNC: 7 MMOL/L (ref 7–16)
AST SERPL-CCNC: 19 U/L (ref 12–45)
BASOPHILS # BLD AUTO: 0.3 % (ref 0–1.8)
BASOPHILS # BLD: 0.03 K/UL (ref 0–0.12)
BILIRUB SERPL-MCNC: 0.5 MG/DL (ref 0.1–1.5)
BUN SERPL-MCNC: 25 MG/DL (ref 8–22)
CALCIUM ALBUM COR SERPL-MCNC: 9.9 MG/DL (ref 8.5–10.5)
CALCIUM SERPL-MCNC: 9.5 MG/DL (ref 8.5–10.5)
CHLORIDE SERPL-SCNC: 105 MMOL/L (ref 96–112)
CO2 SERPL-SCNC: 28 MMOL/L (ref 20–33)
CREAT SERPL-MCNC: 1.04 MG/DL (ref 0.5–1.4)
EOSINOPHIL # BLD AUTO: 0.12 K/UL (ref 0–0.51)
EOSINOPHIL NFR BLD: 1.3 % (ref 0–6.9)
ERYTHROCYTE [DISTWIDTH] IN BLOOD BY AUTOMATED COUNT: 52 FL (ref 35.9–50)
GFR SERPLBLD CREATININE-BSD FMLA CKD-EPI: 59 ML/MIN/1.73 M 2
GLOBULIN SER CALC-MCNC: 2.9 G/DL (ref 1.9–3.5)
GLUCOSE SERPL-MCNC: 142 MG/DL (ref 65–99)
HCT VFR BLD AUTO: 40 % (ref 37–47)
HGB BLD-MCNC: 12.8 G/DL (ref 12–16)
IMM GRANULOCYTES # BLD AUTO: 0.04 K/UL (ref 0–0.11)
IMM GRANULOCYTES NFR BLD AUTO: 0.4 % (ref 0–0.9)
LYMPHOCYTES # BLD AUTO: 3.38 K/UL (ref 1–4.8)
LYMPHOCYTES NFR BLD: 36.3 % (ref 22–41)
MAGNESIUM SERPL-MCNC: 1.9 MG/DL (ref 1.5–2.5)
MCH RBC QN AUTO: 31.2 PG (ref 27–33)
MCHC RBC AUTO-ENTMCNC: 32 G/DL (ref 32.2–35.5)
MCV RBC AUTO: 97.6 FL (ref 81.4–97.8)
MONOCYTES # BLD AUTO: 0.83 K/UL (ref 0–0.85)
MONOCYTES NFR BLD AUTO: 8.9 % (ref 0–13.4)
NEUTROPHILS # BLD AUTO: 4.9 K/UL (ref 1.82–7.42)
NEUTROPHILS NFR BLD: 52.8 % (ref 44–72)
NRBC # BLD AUTO: 0 K/UL
NRBC BLD-RTO: 0 /100 WBC (ref 0–0.2)
PLATELET # BLD AUTO: 223 K/UL (ref 164–446)
PMV BLD AUTO: 10.9 FL (ref 9–12.9)
POTASSIUM SERPL-SCNC: 4.9 MMOL/L (ref 3.6–5.5)
PROT SERPL-MCNC: 6.4 G/DL (ref 6–8.2)
RBC # BLD AUTO: 4.1 M/UL (ref 4.2–5.4)
SODIUM SERPL-SCNC: 140 MMOL/L (ref 135–145)
VALPROATE SERPL-MCNC: 69.7 UG/ML (ref 50–100)
WBC # BLD AUTO: 9.3 K/UL (ref 4.8–10.8)

## 2025-05-06 PROCEDURE — 80164 ASSAY DIPROPYLACETIC ACD TOT: CPT

## 2025-05-06 PROCEDURE — 83735 ASSAY OF MAGNESIUM: CPT

## 2025-05-06 PROCEDURE — 80053 COMPREHEN METABOLIC PANEL: CPT

## 2025-05-06 PROCEDURE — 3078F DIAST BP <80 MM HG: CPT | Performed by: PSYCHIATRY & NEUROLOGY

## 2025-05-06 PROCEDURE — 36415 COLL VENOUS BLD VENIPUNCTURE: CPT

## 2025-05-06 PROCEDURE — 99215 OFFICE O/P EST HI 40 MIN: CPT | Performed by: PSYCHIATRY & NEUROLOGY

## 2025-05-06 PROCEDURE — 3074F SYST BP LT 130 MM HG: CPT | Performed by: PSYCHIATRY & NEUROLOGY

## 2025-05-06 PROCEDURE — 85025 COMPLETE CBC W/AUTO DIFF WBC: CPT

## 2025-05-06 RX ORDER — LISINOPRIL 2.5 MG/1
2.5 TABLET ORAL
COMMUNITY
Start: 2025-04-07

## 2025-05-06 RX ORDER — LEVOTHYROXINE SODIUM 50 UG/1
TABLET ORAL
COMMUNITY
Start: 2025-04-07

## 2025-05-06 ASSESSMENT — ENCOUNTER SYMPTOMS
MEMORY LOSS: 0
SEIZURES: 1
INSOMNIA: 0
HEADACHES: 0
SENSORY CHANGE: 0
WEIGHT LOSS: 1
DEPRESSION: 0
FOCAL WEAKNESS: 0
LOSS OF CONSCIOUSNESS: 1
DIZZINESS: 0
WEAKNESS: 1

## 2025-05-06 ASSESSMENT — PATIENT HEALTH QUESTIONNAIRE - PHQ9: CLINICAL INTERPRETATION OF PHQ2 SCORE: 0

## 2025-05-06 NOTE — ASSESSMENT & PLAN NOTE
Even though we are limited as to the history itself, it does sound as if she did suffer from a breakthrough seizure.  Having been in control for several years on a stable dose of Depakote, we can only presume there was noncompliance.  She was in her usual state of health otherwise, was on no new medications, not suffering from a flulike illness with vomiting or diarrhea, she does not drink, etc.  Her examination today is at baseline.  Still, imaging of the brain should be done for thoroughness, her last study done years ago.  EEG obviously is required.  Depakote level will be checked as well CBC, magnesium and CMP.  Checking prolactin levels this far out after seizure is of no benefit.  I have no reason to believe she is dealing with nonconvulsive seizures.    For now she knows to avoid driving, though I suspect she will be able to return to doing so eventually.  I am more concerned about the signs that suggest toxicity from the Depakote, given her complaints of slowed mentation, decreased energy and the unsteadiness as she walks.  Imaging of the brain again will be helpful in this regard to rule out any supratentorial process.  Depending, she may require another referral to physical therapy for gait training.    In the meantime she continues the Depakote 250 mg 3 times daily and 500 mg nightly regimen.  She is getting assistance to make sure she is compliant.  Orders:    FREE VALPROIC ACID (DEPAKOTE)    EEG; Future    MR-BRAIN-W/O; Future    CBC WITH DIFFERENTIAL; Future    Comp Metabolic Panel; Future    MAGNESIUM; Future

## 2025-05-06 NOTE — ASSESSMENT & PLAN NOTE
Still present, an issue I suspect is more likely related to Depakote than anything else, there is nothing to suggest Parkinson's disease or parkinsonian-like syndrome as cause.  She is dealing with the tremulousness.    We will contact her with test results as they come in and especially if they are abnormal warranting a different approach for diagnoses as well as treatment.  Otherwise we follow-up in several months.    Time: 40 minutes in total spent on patient care including pre-charting, record review, discussion with healthcare staff and documentation.  This includes face-to-face time for exam, review, discussion, as well as counseling and coordinating care.

## 2025-05-06 NOTE — PROGRESS NOTES
Subjective     Isabel Burleson is a 65 y.o. female who presents for follow-up, alone, with a history of generalized seizure and tremor.  She evidently suffered from an event that suggested a seizure back in January of this year.    HPI    Seizures: Isabel had been doing well, seen on an annual basis, the last in May of last year, she has remained seizure-free on Depakene 250 mg taken 3 times a day and 500 mg at bed.  She has been insistent on compliance.  She is tolerated the drug without issue.    In January of this year, she got out of bed to go into take a shower, this was on a Friday, she then remembers falling to the ground, grabbing for shower bar to balance herself and hitting her buttock on the toilet paper dispenser.  She was confused, had been incontinent, was very tired as she awoke.  She had not bitten her tongue.  She did have a headache, and felt very dizzy.  She was able to crawl from the bathroom into the bedroom to reach for a hoodie which she put on.  She had no underwear on.  She laid on the floor in the bathroom until she was found by her son on Sunday, garbage collection day.  She was helped up, cleaned off, and the next day was brought to the clinic at the [facility].  Blood pressure was found to be elevated, she was placed on Prinivil 2.5 mg daily.  Additional diagnostics were not done.  Her sons have been with her ever since insisting on compliance with her medication, keeping the house clean, driving, etc.    With this event she had been in her usual state of health, she is not sure if she may have missed some doses of her medicine.  She had not been on any other medications.  In the shower she denies anything of déjà vu, foul taste or foul smell, suggestive of complex partial onset.  She had been eating well.  She has been sleeping well.    Since then, she has noted unsteadiness as she walks, more than her baseline.  She believes her thinking process has become slower.   "She still lives at home alone, but with her son's help, they are putting her medications out so she takes them consistently.  They are helping keeping her house clean.  She is no longer driving so they are the ones doing the shopping for her.  She sleeps easily.  She has lost about 10 pounds.    The hand tremor is still there, this become more of a bilateral issue for her.  It is still action based.  There is no involvement of the head or voice.    Medical, surgical and family histories are reviewed, there are no new drug allergies.  Other than the Depakene as above, she is also on Glucophage, Eliquis, Jardiance, Synthroid, Prinivil which was recently started after visiting the clinic, Lipitor, calcium with vitamin D and vitamin B12.    Review of Systems   Constitutional:  Positive for weight loss.   Neurological:  Positive for seizures, loss of consciousness and weakness. Negative for dizziness, sensory change, focal weakness and headaches.   Psychiatric/Behavioral:  Negative for depression, memory loss and suicidal ideas. The patient does not have insomnia.    All other systems reviewed and are negative.    Objective     /76 (BP Location: Left arm, Patient Position: Sitting, BP Cuff Size: Adult)   Pulse 95   Temp 36.4 °C (97.6 °F) (Temporal)   Resp 16   Ht 1.676 m (5' 6\")   Wt 101 kg (222 lb 0.1 oz)   SpO2 95%   BMI 35.83 kg/m²      Physical Exam    She appears in no acute distress.  She is quite cooperative.  Her vital signs are stable.  There is no malar rash or sialorrhea.  The neck is supple, range of motion is full, carotid pulses are present without asymmetry.  Cardiac evaluation reveals a regular rhythm.     Neurological Exam    Though her mental processing speed is a little slower than usual, she is oriented.  She follows commands, multistep commands require little extra time to do them correctly.  She names and repeats easily, there is no agnosia or apraxia.    PERRLA/EOMI, visual fields are " full, there is still a flattening of the right nasolabial fold, smile is slower on the right side when compared to the left.  Lip apposition is intact.  The tongue and uvula are midline, there is no bulbar dysfunction.  Sensory exam is intact to temperature and light touch bilaterally.  Jaw movements are intact.  Shoulder shrug and head rotation are normal.    Musculoskeletal exam reveals normal tone, tremor with both hands seen with sustained posture against gravity is unchanged.  There is no bradykinesia.  Strength is intact in all 4 extremities.  Reflexes are symmetric, ankle jerks absent.  The toes are downgoing.    She is very unsteady if she walks, more than usual.  She has her cane, but also leans against the wall, observed as she walks down the hallway when leaving the clinic.  Tandem walking was not attempted as a result.  Repetitive movements are slowed but still symmetrical from side-to-side.  There is no appendicular dystaxia.    Sensory exam is intact to vibration and temperature.  Assessment & Plan  Generalized seizure disorder (HCC)  Even though we are limited as to the history itself, it does sound as if she did suffer from a breakthrough seizure.  Having been in control for several years on a stable dose of Depakote, we can only presume there was noncompliance.  She was in her usual state of health otherwise, was on no new medications, not suffering from a flulike illness with vomiting or diarrhea, she does not drink, etc.  Her examination today is at baseline.  Still, imaging of the brain should be done for thoroughness, her last study done years ago.  EEG obviously is required.  Depakote level will be checked as well CBC, magnesium and CMP.  Checking prolactin levels this far out after seizure is of no benefit.  I have no reason to believe she is dealing with nonconvulsive seizures.    For now she knows to avoid driving, though I suspect she will be able to return to doing so eventually.  I am  more concerned about the signs that suggest toxicity from the Depakote, given her complaints of slowed mentation, decreased energy and the unsteadiness as she walks.  Imaging of the brain again will be helpful in this regard to rule out any supratentorial process.  Depending, she may require another referral to physical therapy for gait training.    In the meantime she continues the Depakote 250 mg 3 times daily and 500 mg nightly regimen.  She is getting assistance to make sure she is compliant.  Orders:    FREE VALPROIC ACID (DEPAKOTE)    EEG; Future    MR-BRAIN-W/O; Future    CBC WITH DIFFERENTIAL; Future    Comp Metabolic Panel; Future    MAGNESIUM; Future    Tremor of both hands  Still present, an issue I suspect is more likely related to Depakote than anything else, there is nothing to suggest Parkinson's disease or parkinsonian-like syndrome as cause.  She is dealing with the tremulousness.    We will contact her with test results as they come in and especially if they are abnormal warranting a different approach for diagnoses as well as treatment.  Otherwise we follow-up in several months.    Time: 40 minutes in total spent on patient care including pre-charting, record review, discussion with healthcare staff and documentation.  This includes face-to-face time for exam, review, discussion, as well as counseling and coordinating care.

## 2025-07-01 ENCOUNTER — HOSPITAL ENCOUNTER (OUTPATIENT)
Dept: RADIOLOGY | Facility: MEDICAL CENTER | Age: 65
End: 2025-07-01
Attending: PSYCHIATRY & NEUROLOGY
Payer: MEDICARE

## 2025-07-01 ENCOUNTER — NON-PROVIDER VISIT (OUTPATIENT)
Dept: NEUROLOGY | Facility: MEDICAL CENTER | Age: 65
End: 2025-07-01
Attending: PSYCHIATRY & NEUROLOGY
Payer: MEDICARE

## 2025-07-01 DIAGNOSIS — G40.309 GENERALIZED SEIZURE DISORDER (HCC): ICD-10-CM

## 2025-07-01 PROCEDURE — 70551 MRI BRAIN STEM W/O DYE: CPT

## 2025-07-01 NOTE — PROCEDURES
(No note.)   The study was reviewed in bipolar and referential montages. The recording examined the patient in the  awake, drowsy, and sleep state(s).     DESCRIPTION OF THE RECORD:  During wakefulness, the background was continuous and showed a 9 Hz posterior dominant rhythm.  There was reactivity to eye closure/opening.  An anterior-posterior gradient was noted with faster beta frequencies seen anteriorly.  During drowsiness, theta/delta frequencies were seen.    Sleep was captured and was characterized by diffuse background delta/theta activity with a loss of myogenic artifact.  N2 sleep transients in the form of sleep spindles and vertex waves were seen in the leads over the central regions.     ICTAL AND INTERICTAL FINDINGS:   No focal or generalized epileptiform activity noted.     No regional slowing or persistent focal asymmetries were seen.    No seizures.     ACTIVATION PROCEDURES:   Intermittent Photic stimulation was performed in a stepwise fashion from 1 to 30 Hz and did not elicited any abnormalities on EEG.     EKG: Sampling of the EKG recording showed sinus rhythm    EVENTS:    None    INTERPRETATION:  Normal video EEG recording in the awake, drowsy, and sleep state(s):  -No persistent focal asymmetries seen.  -No definitive epileptiform discharges were seen.  -No focal slowing  -No seizures.   -Clinical Events: None          Griffin Freire MD  Department of Neurology at [facility]  General Neurologist and Epileptologist  Director of [facility] of the [facility]'s Level III Comprehensive Epilepsy Program  Professor of Clinical Neurology, [facility].   Phone: [phone]  Fax: [phone]  E-mail: [email or web address]